# Patient Record
Sex: FEMALE | URBAN - METROPOLITAN AREA
[De-identification: names, ages, dates, MRNs, and addresses within clinical notes are randomized per-mention and may not be internally consistent; named-entity substitution may affect disease eponyms.]

---

## 2024-07-03 ENCOUNTER — LAB REQUISITION (OUTPATIENT)
Dept: LAB | Facility: CLINIC | Age: 24
End: 2024-07-03

## 2024-07-03 DIAGNOSIS — R30.0 DYSURIA: ICD-10-CM

## 2024-07-03 PROCEDURE — 87491 CHLMYD TRACH DNA AMP PROBE: CPT | Performed by: INTERNAL MEDICINE

## 2024-07-03 PROCEDURE — 87591 N.GONORRHOEAE DNA AMP PROB: CPT | Performed by: INTERNAL MEDICINE

## 2024-07-04 LAB
C TRACH DNA SPEC QL NAA+PROBE: NEGATIVE
N GONORRHOEA DNA SPEC QL NAA+PROBE: NEGATIVE

## 2025-03-04 ENCOUNTER — HOSPITAL ENCOUNTER (OUTPATIENT)
Dept: ULTRASOUND IMAGING | Facility: CLINIC | Age: 25
Discharge: HOME | End: 2025-03-04
Attending: MIDWIFE

## 2025-03-04 DIAGNOSIS — Z34.90 EARLY STAGE OF PREGNANCY: ICD-10-CM

## 2025-03-04 PROCEDURE — T1013 SIGN LANG/ORAL INTERPRETER: HCPCS

## 2025-03-04 PROCEDURE — 76801 OB US < 14 WKS SINGLE FETUS: CPT

## 2025-03-04 PROCEDURE — 76801 OB US < 14 WKS SINGLE FETUS: CPT | Mod: 26 | Performed by: RADIOLOGY

## 2025-03-04 PROCEDURE — 76817 TRANSVAGINAL US OBSTETRIC: CPT | Mod: 26 | Performed by: RADIOLOGY

## 2025-03-17 ENCOUNTER — TRANSCRIBE ORDERS (OUTPATIENT)
Dept: MATERNAL FETAL MEDICINE | Facility: CLINIC | Age: 25
End: 2025-03-17

## 2025-03-17 DIAGNOSIS — O26.90 PREGNANCY RELATED CONDITION, ANTEPARTUM: Primary | ICD-10-CM

## 2025-03-20 ENCOUNTER — APPOINTMENT (OUTPATIENT)
Dept: INTERPRETER SERVICES | Facility: CLINIC | Age: 25
End: 2025-03-20

## 2025-04-09 ENCOUNTER — LAB REQUISITION (OUTPATIENT)
Dept: LAB | Facility: CLINIC | Age: 25
End: 2025-04-09
Payer: MEDICAID

## 2025-04-09 DIAGNOSIS — O09.92 SUPERVISION OF HIGH RISK PREGNANCY, UNSPECIFIED, SECOND TRIMESTER: ICD-10-CM

## 2025-04-09 DIAGNOSIS — Z78.9 OTHER SPECIFIED HEALTH STATUS: ICD-10-CM

## 2025-04-09 DIAGNOSIS — R30.0 DYSURIA: ICD-10-CM

## 2025-04-09 PROCEDURE — 87086 URINE CULTURE/COLONY COUNT: CPT | Performed by: MIDWIFE

## 2025-04-09 PROCEDURE — 87491 CHLMYD TRACH DNA AMP PROBE: CPT | Mod: ORL | Performed by: MIDWIFE

## 2025-04-09 PROCEDURE — 87491 CHLMYD TRACH DNA AMP PROBE: CPT | Performed by: MIDWIFE

## 2025-04-09 PROCEDURE — 87086 URINE CULTURE/COLONY COUNT: CPT | Mod: ORL | Performed by: MIDWIFE

## 2025-04-10 ENCOUNTER — PRE VISIT (OUTPATIENT)
Dept: MATERNAL FETAL MEDICINE | Facility: CLINIC | Age: 25
End: 2025-04-10

## 2025-04-10 LAB
C TRACH DNA SPEC QL PROBE+SIG AMP: NEGATIVE
N GONORRHOEA DNA SPEC QL NAA+PROBE: NEGATIVE
SPECIMEN TYPE: NORMAL

## 2025-04-11 ENCOUNTER — HOSPITAL ENCOUNTER (OUTPATIENT)
Dept: ULTRASOUND IMAGING | Facility: CLINIC | Age: 25
Discharge: HOME | End: 2025-04-11
Attending: ADVANCED PRACTICE MIDWIFE

## 2025-04-11 ENCOUNTER — LAB (OUTPATIENT)
Dept: LAB | Facility: CLINIC | Age: 25
End: 2025-04-11
Attending: ADVANCED PRACTICE MIDWIFE

## 2025-04-11 ENCOUNTER — MEDICAL CORRESPONDENCE (OUTPATIENT)
Dept: HEALTH INFORMATION MANAGEMENT | Facility: CLINIC | Age: 25
End: 2025-04-11

## 2025-04-11 DIAGNOSIS — Z31.5 ENCOUNTER FOR PROCREATIVE GENETIC COUNSELING AND TESTING: ICD-10-CM

## 2025-04-11 DIAGNOSIS — O28.3 ABNORMAL PRENATAL ULTRASOUND: ICD-10-CM

## 2025-04-11 DIAGNOSIS — O26.90 PREGNANCY RELATED CONDITION, ANTEPARTUM: ICD-10-CM

## 2025-04-11 LAB — BACTERIA UR CULT: NO GROWTH

## 2025-04-11 PROCEDURE — 76811 OB US DETAILED SNGL FETUS: CPT

## 2025-04-11 PROCEDURE — 76811 OB US DETAILED SNGL FETUS: CPT | Mod: 26 | Performed by: OBSTETRICS & GYNECOLOGY

## 2025-04-11 PROCEDURE — 99203 OFFICE O/P NEW LOW 30 MIN: CPT | Mod: 25 | Performed by: OBSTETRICS & GYNECOLOGY

## 2025-04-11 PROCEDURE — 36415 COLL VENOUS BLD VENIPUNCTURE: CPT

## 2025-04-21 ENCOUNTER — TELEPHONE (OUTPATIENT)
Dept: MATERNAL FETAL MEDICINE | Facility: CLINIC | Age: 25
End: 2025-04-21

## 2025-04-21 LAB — SCANNED LAB RESULT: NORMAL

## 2025-04-22 ENCOUNTER — APPOINTMENT (OUTPATIENT)
Dept: INTERPRETER SERVICES | Facility: CLINIC | Age: 25
End: 2025-04-22

## 2025-04-22 NOTE — TELEPHONE ENCOUNTER
4/22/2025       Called April to discuss prenatal cell free DNA screening results.  Results came back negative for chromosome abnormalities in chromosomes 21, 18, & 13, as well as the sex chromosomes and select microdeletions.  These test results do not definitively rule out the possibility of one of these conditions, but they do greatly reduce the likelihood.  The test identified sex chromosomes consistent with male sex (XY) which is consistent with ultrasound.  April had no questions at this time and was encouraged to reach out if she has any questions or concerns in the future.  We briefly reviewed her ultrasound findings of CPCs and possible absent kidney/pelvic kidney and Janeen April confirmed her upcoming [facility] appointment for follow up ultrasound.    This call was facilitated by a  through [employer].     Narinder Cyr MS, [facility]  Licensed Genetic Counselor  Phone: [phone]  Pager: [phone]

## 2025-05-02 ENCOUNTER — HOSPITAL ENCOUNTER (OUTPATIENT)
Dept: ULTRASOUND IMAGING | Facility: CLINIC | Age: 25
Discharge: HOME | End: 2025-05-02
Attending: OBSTETRICS & GYNECOLOGY
Payer: MEDICAID

## 2025-05-02 DIAGNOSIS — Z36.2 ENCOUNTER FOR FOLLOW-UP ULTRASOUND OF FETAL ANATOMY: ICD-10-CM

## 2025-05-02 PROCEDURE — 76816 OB US FOLLOW-UP PER FETUS: CPT

## 2025-05-02 PROCEDURE — 99213 OFFICE O/P EST LOW 20 MIN: CPT | Mod: 25 | Performed by: OBSTETRICS & GYNECOLOGY

## 2025-05-02 PROCEDURE — 76816 OB US FOLLOW-UP PER FETUS: CPT | Mod: 26 | Performed by: OBSTETRICS & GYNECOLOGY

## 2025-05-30 ENCOUNTER — HOSPITAL ENCOUNTER (OUTPATIENT)
Facility: CLINIC | Age: 25
End: 2025-05-30
Admitting: ADVANCED PRACTICE MIDWIFE
Payer: MEDICAID

## 2025-05-30 ENCOUNTER — HOSPITAL ENCOUNTER (OUTPATIENT)
Facility: CLINIC | Age: 25
Discharge: HOME | End: 2025-05-30
Attending: ADVANCED PRACTICE MIDWIFE | Admitting: ADVANCED PRACTICE MIDWIFE
Payer: MEDICAID

## 2025-05-30 ENCOUNTER — APPOINTMENT (OUTPATIENT)
Dept: ULTRASOUND IMAGING | Facility: CLINIC | Age: 25
End: 2025-05-30
Attending: ADVANCED PRACTICE MIDWIFE
Payer: MEDICAID

## 2025-05-30 VITALS
DIASTOLIC BLOOD PRESSURE: 58 MMHG | SYSTOLIC BLOOD PRESSURE: 102 MMHG | RESPIRATION RATE: 16 BRPM | HEART RATE: 77 BPM | WEIGHT: 130.73 LBS | TEMPERATURE: 99 F

## 2025-05-30 PROBLEM — Z36.89 ENCOUNTER FOR TRIAGE IN PREGNANT PATIENT: Status: ACTIVE | Noted: 2025-05-30

## 2025-05-30 PROBLEM — O09.92 SUPERVISION OF HIGH RISK PREGNANCY IN SECOND TRIMESTER: Status: ACTIVE | Noted: 2025-05-30
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
## 2025-05-30 PROCEDURE — 76819 FETAL BIOPHYS PROFIL W/O NST: CPT

## 2025-05-30 PROCEDURE — 99203 OFFICE O/P NEW LOW 30 MIN: CPT | Performed by: ADVANCED PRACTICE MIDWIFE

## 2025-05-30 PROCEDURE — 250N000013 HC RX MED GY IP 250 OP 250 PS 637: Performed by: ADVANCED PRACTICE MIDWIFE

## 2025-05-30 PROCEDURE — G0463 HOSPITAL OUTPT CLINIC VISIT: HCPCS

## 2025-05-30 RX ORDER — ACETAMINOPHEN 325 MG/1
TABLET ORAL
Status: COMPLETED
Start: 2025-05-30 | End: 2025-05-30

## 2025-05-30 RX ORDER — DOXYLAMINE SUCCINATE AND PYRIDOXINE HYDROCHLORIDE, DELAYED RELEASE TABLETS 10 MG/10 MG 10; 10 MG/1; MG/1
1 TABLET, DELAYED RELEASE ORAL 3 TIMES DAILY PRN
COMMUNITY

## 2025-05-30 RX ORDER — ACETAMINOPHEN 325 MG/1
975 TABLET ORAL ONCE
Status: COMPLETED | OUTPATIENT
Start: 2025-05-30 | End: 2025-05-30

## 2025-05-30 RX ORDER — VITAMIN A, VITAMIN C, VITAMIN D-3, VITAMIN E, VITAMIN B-1, VITAMIN B-2, NIACIN, VITAMIN B-6, CALCIUM, IRON, ZINC, COPPER 4000; 120; 400; 22; 1.84; 3; 20; 10; 1; 12; 200; 27; 25; 2 [IU]/1; MG/1; [IU]/1; MG/1; MG/1; MG/1; MG/1; MG/1; MG/1; UG/1; MG/1; MG/1; MG/1; MG/1
1 TABLET ORAL DAILY
COMMUNITY

## 2025-05-30 RX ORDER — ERGOCALCIFEROL 1.25 MG/1
50000 CAPSULE ORAL DAILY
COMMUNITY

## 2025-05-30 RX ADMIN — ACETAMINOPHEN 975 MG: 325 TABLET ORAL at 19:14

## 2025-05-30 RX ADMIN — ACETAMINOPHEN 975 MG: 325 TABLET, FILM COATED ORAL at 19:14

## 2025-05-30 ASSESSMENT — ACTIVITIES OF DAILY LIVING (ADL)
ADLS_ACUITY_SCORE: 15
ADLS_ACUITY_SCORE: 15
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
## 2025-05-31 NOTE — PROGRESS NOTES
"1940 Patient returned from US.   BPP 8/8.   Patient reports pain is nearly gone after taking Tylenol.    1945 Bre Wellington paged with results.   Order placed for discharge to home.     AVS reviewed in Estonian with patient, including \"When to call: after 20 weeks\".   Patient will follow up with CNM as scheduled in clinic. Patient verbalizes understanding of teaching.   Patient discharged to home via ambulatory at 2000.      "

## 2025-05-31 NOTE — DISCHARGE INSTRUCTIONS
Aprenda cuándo llamar a vuong médico liz el embarazo (después de 20 semanas)  Learning About When to Call Your Doctor During Pregnancy (After 20 Weeks)  Instrucciones de cuidado  Es normal que tenga inquietudes acerca de lo que podría ser un problema liz el embarazo. Aunque la mayoría de las mujeres embarazadas no tienen ningún problema grave, es importante saber cuándo llamar a vuong médico si tiene determinados síntomas o señales de trabajo de parto.  Estas son algunas sugerencias generales. Vuong médico puede darle más información sobre cuándo llamar.  Cuándo llamar a vuong médico (después de 20 semanas)  Llame al 911  en cualquier momento que considere que necesita atención de urgencia. Por ejemplo, llame si:  Tiene sangrado vaginal intenso.  Tiene dolor repentino e intenso en el abdomen.  Se desmayó (perdió el conocimiento).  Tiene david convulsión.  Ve o siente el cordón umbilical.  Lorelei que está a punto de mario a salty a vuong bebé y no puede llegar en forma frias al hospital.  Llame a vuong médico ahora mismo o busque atención médica inmediata si:  Tiene sangrado vaginal.  Tiene dolor en el abdomen.  Tiene fiebre.  Tiene síntomas de preeclampsia, sushant:  Hinchazón repentina de la daniel, las franki o los pies.  Nuevos problemas de visión (sushant oscurecimiento, susannah borroso o susannah puntos).  Dolor de neto intenso.  Tiene david pérdida repentina de líquido por la vagina. (Piensa que rompió la farzad).  Piensa que puede jane comenzado el trabajo de parto. [place] significa que ha tenido al menos 6 contracciones en david hora.  Nota que vuong bebé ha dejado de moverse o lo hace mucho menos de lo habitual.  Tiene síntomas de david infección urinaria. Estos pueden incluir:  Dolor o ardor al orinar.  Necesidad de orinar con frecuencia sin poder eliminar mucha orina.  Dolor en el flanco, que se encuentra dhaval debajo de la caja torácica y arriba de la cintura en un lado de la espalda.  Alvarado en la orina.  Preste especial atención a los cambios  "en vuong irais y asegúrese de comunicarse con vuong médico si:  Tiene flujo vaginal con un olor desagradable.  Tiene cambios en la piel, tales sushant:  Salpullido.  Comezón.  Color amarillento en la piel.  Tiene otras inquietudes acerca de vuong embarazo.  Si tiene signos de trabajo de parto al llegar a las 37 semanas o más  Si tiene señales de trabajo de parto a las 37 semanas o más, es posible que vuong médico le diga que llame cuando vuong trabajo de parto se vuelva más activo. Los síntomas del trabajo de parto activo incluyen:  Contracciones que son regulares.  Contracciones a intervalos de menos de 5 minutos.  Contracciones liz las cuales es difícil hablar.  La atención de seguimiento es david parte clave de vuong tratamiento y seguridad. Asegúrese de hacer y acudir a todas las citas, y llame a vuong médico si está teniendo problemas. También es david buena idea saber los resultados de elroy exámenes y mantener david lista de los medicamentos que morenita.   Dónde puede encontrar más información en inglés?  Vaya a [email or web address]  Escriba N531 en la búsqueda para aprender más acerca de \"Aprenda cuándo llamar a vuong médico liz el embarazo (después de 20 semanas).\"  Revisado: 30 abril, 2024  Versión del contenido: 14.4    [id number] [employer].   Las instrucciones de cuidado fueron adaptadas bajo licencia por vuong profesional de atención médica. Si usted tiene preguntas sobre david afección médica o sobre estas instrucciones, siempre pregunte a vuong profesional de irais. [employer] niega toda garantía o responsabilidad por vuong uso de esta información.    "

## 2025-06-18 ENCOUNTER — LAB REQUISITION (OUTPATIENT)
Dept: LAB | Facility: CLINIC | Age: 25
End: 2025-06-18
Payer: MEDICAID

## 2025-06-18 DIAGNOSIS — O09.92 SUPERVISION OF HIGH RISK PREGNANCY, UNSPECIFIED, SECOND TRIMESTER: ICD-10-CM

## 2025-06-18 LAB
T PALLIDUM AB SER QL: NONREACTIVE
VIT D+METAB SERPL-MCNC: 27 NG/ML (ref 20–50)

## 2025-06-18 PROCEDURE — 82306 VITAMIN D 25 HYDROXY: CPT | Mod: ORL | Performed by: MIDWIFE

## 2025-06-18 PROCEDURE — 86780 TREPONEMA PALLIDUM: CPT | Mod: ORL | Performed by: MIDWIFE
